# Patient Record
Sex: FEMALE | Race: BLACK OR AFRICAN AMERICAN | NOT HISPANIC OR LATINO | Employment: FULL TIME | URBAN - METROPOLITAN AREA
[De-identification: names, ages, dates, MRNs, and addresses within clinical notes are randomized per-mention and may not be internally consistent; named-entity substitution may affect disease eponyms.]

---

## 2017-05-30 ENCOUNTER — NON-PROVIDER VISIT (OUTPATIENT)
Dept: URGENT CARE | Facility: CLINIC | Age: 37
End: 2017-05-30

## 2017-05-30 DIAGNOSIS — Z02.1 PRE-EMPLOYMENT DRUG SCREENING: ICD-10-CM

## 2017-05-30 LAB
AMP AMPHETAMINE: NORMAL
COC COCAINE: NORMAL
INT CON NEG: NORMAL
INT CON POS: NORMAL
MET METHAMPHETAMINES: NORMAL
OPI OPIATES: NORMAL
PCP PHENCYCLIDINE: NORMAL
POC DRUG COMMENT 753798-OCCUPATIONAL HEALTH: NORMAL
THC: NORMAL

## 2017-05-30 PROCEDURE — 80305 DRUG TEST PRSMV DIR OPT OBS: CPT | Performed by: PHYSICIAN ASSISTANT

## 2017-07-18 ENCOUNTER — NON-PROVIDER VISIT (OUTPATIENT)
Dept: OCCUPATIONAL MEDICINE | Facility: CLINIC | Age: 37
End: 2017-07-18

## 2017-07-18 DIAGNOSIS — Z02.1 DRUG SCREENING, PRE-EMPLOYMENT: ICD-10-CM

## 2017-07-18 PROCEDURE — 8898 PR URINE 11 PANEL - SEND TO LAB: Performed by: PREVENTIVE MEDICINE

## 2017-07-18 NOTE — MR AVS SNAPSHOT
Esa Da Silva   2017 1:10 PM   Appointment   MRN: [id number]    Department:  [facility]   Dept Phone:  [phone]    Description:  Female : 1980   Provider:  [place] NON [facility]           Allergies as of 2017     No Known Allergies      Vital Signs     Smoking Status                   Never Smoker            Basic Information     Date Of Birth Sex Race Ethnicity Preferred Language    1980 Female Black or  Non- English      Health Maintenance        Date Due Completion Dates    IMM DTaP/Tdap/Td Vaccine (1 - Tdap) 10/2/1997 ---    PAP SMEAR 10/2/1999 ---    IMM INFLUENZA (1) 2017 ---            Current Immunizations     Tuberculin Skin Test 2016  2:14 PM, 2016  2:14 PM, 2015 10:00 AM      Below and/or attached are the medications your provider expects you to take. Review all of your home medications and newly ordered medications with your provider and/or pharmacist. Follow medication instructions as directed by your provider and/or pharmacist. Please keep your medication list with you and share with your provider. Update the information when medications are discontinued, doses are changed, or new medications (including over-the-counter products) are added; and carry medication information at all times in the event of emergency situations     Allergies:  No Known Allergies          Medications  Valid as of: 2017 -  4:45 PM    Generic Name Brand Name Tablet Size Instructions for use    Cephalexin (Cap) KEFLEX 500 MG Take 1 Cap by mouth 4 times a day.        HydroCHLOROthiazide (Tab) HYDRODIURIL 25 MG Take 1 Tab by mouth every day.        .                 Medicines prescribed today were sent to:     None      Medication refill instructions:       If your prescription bottle indicates you have medication refills left, it is not necessary to call your provider’s office. Please contact your pharmacy and they will refill your  medication.    If your prescription bottle indicates you do not have any refills left, you may request refills at any time through one of the following ways: The online [employer] system (except Urgent Care), by calling your provider’s office, or by asking your pharmacy to contact your provider’s office with a refill request. Medication refills are processed only during regular business hours and may not be available until the next business day. Your provider may request additional information or to have a follow-up visit with you prior to refilling your medication.   *Please Note: Medication refills are assigned a new Rx number when refilled electronically. Your pharmacy may indicate that no refills were authorized even though a new prescription for the same medication is available at the pharmacy. Please request the medicine by name with the pharmacy before contacting your provider for a refill.           [employer] Access Code: [email or web address]  Expires: 8/18/2017  4:45 PM    [employer]  A secure, online tool to manage your health information     [employer]’s [employer]® is a secure, online tool that connects you to your personalized health information from the privacy of your home -- day or night - making it very easy for you to manage your healthcare. Once the activation process is completed, you can even access your medical information using the [employer] meghna, which is available for free in the Apple Meghna store or Google Play store.     [employer] provides the following levels of access (as shown below):   My Chart Features   [facility]  Specialists [facility]  Urgent  Care [facility]  Primary Care  Doctor   Email your healthcare team securely and privately 24/7 X X X    Manage appointments: schedule your next appointment; view details of past/upcoming appointments X      Request prescription refills. X      View recent personal medical records, including lab and immunizations X X X X   View health  record, including health history, allergies, medications X X X X   Read reports about your outpatient visits, procedures, consult and ER notes X X X X   See your discharge summary, which is a recap of your hospital and/or ER visit that includes your diagnosis, lab results, and care plan. X X       How to register for [employer]:  1. Go to  [email or web address].  2. Click on the Sign Up Now box, which takes you to the New Member Sign Up page. You will need to provide the following information:  a. Enter your [employer] Access Code exactly as it appears at the top of this page. (You will not need to use this code after you’ve completed the sign-up process. If you do not sign up before the expiration date, you must request a new code.)   b. Enter your date of birth.   c. Enter your home email address.   d. Click Submit, and follow the next screen’s instructions.  3. Create a [employer] ID. This will be your [employer] login ID and cannot be changed, so think of one that is secure and easy to remember.  4. Create a [employer] password. You can change your password at any time.  5. Enter your Password Reset Question and Answer. This can be used at a later time if you forget your password.   6. Enter your e-mail address. This allows you to receive e-mail notifications when new information is available in [employer].  7. Click Sign Up. You can now view your health information.    For assistance activating your [employer] account, call [phone]

## 2017-10-03 ENCOUNTER — OFFICE VISIT (OUTPATIENT)
Dept: URGENT CARE | Facility: CLINIC | Age: 37
End: 2017-10-03

## 2017-10-03 ENCOUNTER — NON-PROVIDER VISIT (OUTPATIENT)
Dept: URGENT CARE | Facility: CLINIC | Age: 37
End: 2017-10-03

## 2017-10-03 DIAGNOSIS — Z01.89 RESPIRATORY CLEARANCE EXAMINATION, ENCOUNTER FOR: ICD-10-CM

## 2017-10-03 PROCEDURE — 94375 RESPIRATORY FLOW VOLUME LOOP: CPT | Performed by: NURSE PRACTITIONER

## 2017-10-03 NOTE — PROGRESS NOTES
Esa Da Silva is a 37 y.o. female here for a non-provider visit for Mask Fit/ Respiratory Clearance    If abnormal was an in office provider notified today (if so, indicate provider)? Yes  Routed to PCP? No

## 2022-06-08 ENCOUNTER — APPOINTMENT (OUTPATIENT)
Dept: INTERNAL MEDICINE | Facility: OTHER | Age: 42
End: 2022-06-08
Payer: COMMERCIAL

## 2022-06-29 ENCOUNTER — OFFICE VISIT (OUTPATIENT)
Dept: INTERNAL MEDICINE | Facility: OTHER | Age: 42
End: 2022-06-29
Payer: COMMERCIAL

## 2022-06-29 VITALS
WEIGHT: 250.6 LBS | BODY MASS INDEX: 42.78 KG/M2 | OXYGEN SATURATION: 93 % | HEIGHT: 64 IN | TEMPERATURE: 98.2 F | HEART RATE: 75 BPM | DIASTOLIC BLOOD PRESSURE: 103 MMHG | SYSTOLIC BLOOD PRESSURE: 183 MMHG

## 2022-06-29 DIAGNOSIS — Z00.00 ENCOUNTER FOR PREVENTIVE HEALTH EXAMINATION: ICD-10-CM

## 2022-06-29 DIAGNOSIS — I10 HYPERTENSION, UNSPECIFIED TYPE: ICD-10-CM

## 2022-06-29 DIAGNOSIS — E66.01 CLASS 3 SEVERE OBESITY DUE TO EXCESS CALORIES WITH SERIOUS COMORBIDITY AND BODY MASS INDEX (BMI) OF 40.0 TO 44.9 IN ADULT (HCC): ICD-10-CM

## 2022-06-29 DIAGNOSIS — F32.89 OTHER DEPRESSION: ICD-10-CM

## 2022-06-29 DIAGNOSIS — D64.9 ANEMIA, UNSPECIFIED TYPE: ICD-10-CM

## 2022-06-29 DIAGNOSIS — I21.3 ACUTE ST ELEVATION MYOCARDIAL INFARCTION (STEMI), UNSPECIFIED ARTERY (HCC): ICD-10-CM

## 2022-06-29 PROBLEM — R73.02 IMPAIRED GLUCOSE TOLERANCE: Status: ACTIVE | Noted: 2021-07-01

## 2022-06-29 PROBLEM — E66.09 OBESITY DUE TO EXCESS CALORIES WITH SERIOUS COMORBIDITY: Status: ACTIVE | Noted: 2022-06-29

## 2022-06-29 PROBLEM — F32.A DEPRESSION: Status: ACTIVE | Noted: 2022-06-29

## 2022-06-29 PROCEDURE — 93000 ELECTROCARDIOGRAM COMPLETE: CPT | Performed by: INTERNAL MEDICINE

## 2022-06-29 PROCEDURE — 99204 OFFICE O/P NEW MOD 45 MIN: CPT | Mod: GC | Performed by: HOSPITALIST

## 2022-06-29 PROCEDURE — 93000 ELECTROCARDIOGRAM COMPLETE: CPT | Mod: GC | Performed by: HOSPITALIST

## 2022-06-29 RX ORDER — METOPROLOL SUCCINATE 50 MG/1
50 TABLET, EXTENDED RELEASE ORAL DAILY
COMMUNITY
End: 2022-06-29

## 2022-06-29 RX ORDER — CARVEDILOL 3.12 MG/1
3.12 TABLET ORAL 2 TIMES DAILY WITH MEALS
Qty: 60 TABLET | Refills: 0 | Status: SHIPPED | OUTPATIENT
Start: 2022-06-29 | End: 2022-07-01

## 2022-06-29 RX ORDER — CLOPIDOGREL BISULFATE 75 MG/1
75 TABLET ORAL DAILY
COMMUNITY

## 2022-06-29 RX ORDER — LISINOPRIL 20 MG/1
20 TABLET ORAL 2 TIMES DAILY
COMMUNITY
End: 2022-06-29

## 2022-06-29 RX ORDER — ATORVASTATIN CALCIUM 80 MG/1
40 TABLET, FILM COATED ORAL 2 TIMES DAILY
COMMUNITY
Start: 2021-06-08

## 2022-06-29 ASSESSMENT — ENCOUNTER SYMPTOMS
SENSORY CHANGE: 0
VOMITING: 0
BLURRED VISION: 0
SHORTNESS OF BREATH: 0
DOUBLE VISION: 0
DIARRHEA: 0
HEADACHES: 0
WEAKNESS: 0
CLAUDICATION: 0
DEPRESSION: 1
NAUSEA: 0
PALPITATIONS: 0
NERVOUS/ANXIOUS: 0
CONSTIPATION: 0
CHILLS: 0
COUGH: 0
PHOTOPHOBIA: 0
TINGLING: 0
FEVER: 0
ABDOMINAL PAIN: 0
MYALGIAS: 0
HEARTBURN: 0
LOSS OF CONSCIOUSNESS: 0
WEIGHT LOSS: 0
DIZZINESS: 0
SEIZURES: 0
BLOOD IN STOOL: 0
FOCAL WEAKNESS: 0

## 2022-06-29 ASSESSMENT — PATIENT HEALTH QUESTIONNAIRE - PHQ9: CLINICAL INTERPRETATION OF PHQ2 SCORE: 0

## 2022-06-29 ASSESSMENT — LIFESTYLE VARIABLES: SUBSTANCE_ABUSE: 1

## 2022-06-29 NOTE — PROGRESS NOTES
Teaching Physician Attestation      Level of Participation    I have personally interviewed and examined the patient.  In addition, I discussed with the resident physician the patient's history, exam, assessment and plan in detail.  Topics listed in my addendum were the focus of the visit.  Healthcare maintenance was not addressed this visit unless listed as a topic in my addendum.  I agree with the plan as written along with the following additions/modifications:      Establishing Care      PMH: CAD s/p stent, aneamia, morbid obesity with prediabetes,  Marijuana and alcohol use (4 drinks) on weekends    Hypertensive Urgency  -No neurologic or cardiovascular symptoms, [place] prehospital stroke screen negative.  Although EKG has suboptimal baseline, she has no clear ischemic changes.  EKG is pertinent for borderline left axis deviation possibly secondary to left anterior fascicular block, also abnormal R wave transition in lead II, possibly secondary to lead placement.    Plan  -Begin Coreg 3.125 twice daily given dual purpose of control of heart rate given history of coronary disease and also hypertension control, check BMP.  -Follow-up in 2 days for further titration, any signs or symptoms of endorgan damage to ED immediately    History of coronary artery disease status post stent  -Encourage patient to restart her atorvastatin 80 mg daily prescribed by her cardiologist, also emphasized the critical nature of continuing clopidogrel 75 mg daily to prevent stent restenosis.  Encouraged her to follow-up with her cardiologist for further management, appreciate support, asked her to request records.  Of note, we have stopped metoprolol and started carvedilol given that she was lost to follow-up with cardiology and using Coreg for the dual purpose of treating her hypertensive urgency and also heart rate control for history of coronary artery disease.      Depression  -no alarm sx, +phq2  -Not fully addressed due to  above, but behavioral health referral placed, appreciate support    Morbid obesity with history of prediabetes  -Checking A1c, lipids, fasting CMP, not fully addressed, follow-up in future visit    Check cbc to assess anemia status    rtc 2 days for hypertension follow-up.

## 2022-06-29 NOTE — PROGRESS NOTES
"Subjective     Esa Da Silva is a 41 y.o. female who presents with New Patient (Patient here for htn and medication refills )            HPI  Patient presents today to \"get back on track with health\".  Patient reports that she has not seen a doctor in approximately 1 year and has not taking her medications in 1 year.    Past medical history: Prediabetes, hypertension, myocardial infarction status post stent in 2017, anemia    Social history: Patient reports that she is never smoked tobacco products and she has never vapped.  Patient reports having 4 alcoholic beverages on the weekend as well as using marijuana on the weekends socially.    Past surgical history: Patient reports having heart cath placed in 2017, and 3 C-sections.    Family history: Hypertension in her mother, stroke in both grandparents    Medications: Hydrochlorothiazide 25 mg daily, lisinopril 20 mg twice daily, furosemide 20 mg daily, clopidogrel 75 mg, atorvastatin 40 mg, metoprolol succinate 50 mg daily.  Of note patient reports that she has not taking any of the said medications in approximately 1 year.    Allergies: No allergies    1. Hypertension, unspecified type  In office blood pressure noted initially elevated at 204/107, repeat blood pressure improved to 193/103.  Patient denied any lightheadedness, chest pain, palpitations, vision changes, dizziness, or headache.  EKG in office revealed sinus rhythm, left axis deviation, and possible left atrial abnormality.  Physical exam was negative for facial droop, slurred speech, and pronator drift.  Patient reports at home blood pressure readings in the last month ranging from 115/1 10-2 100s over 100s.  Of note when discussed proper technique and taking blood pressure, patient reports that she is often talking during her blood pressure measurements at home.    2. Obesity  BMI today is elevated at 43.29. Patient reports a diet that mainly consists of quick and easy foods to make such as top " "Gerard.  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      3. Depression  PHQ 2 depression screening was positive for patient reporting loss of interest less than half of the days over the past 2 weeks and depressed mood for less than half of the days over the past 2 weeks.  Patient denies any suicidal or homicidal ideation at this time and denies any anxiety.  Patient reports that she has had these depressive symptoms and loss of interest for the past 2 years.  Patient is interested in seeing someone in behavioral health to discuss further.    Review of Systems   Constitutional: Negative for chills, fever, malaise/fatigue and weight loss.   HENT: Negative for ear pain, hearing loss and tinnitus.    Eyes: Negative for blurred vision, double vision and photophobia.   Respiratory: Negative for cough and shortness of breath.    Cardiovascular: Negative for chest pain, palpitations, claudication and leg swelling.   Gastrointestinal: Negative for abdominal pain, blood in stool, constipation, diarrhea, heartburn, melena, nausea and vomiting.   Genitourinary: Negative for dysuria and hematuria.   Musculoskeletal: Negative for joint pain and myalgias.   Neurological: Negative for dizziness, tingling, sensory change, focal weakness, seizures, loss of consciousness, weakness and headaches.   Psychiatric/Behavioral: Positive for depression and substance abuse. Negative for suicidal ideas. The patient is not nervous/anxious.               Objective     BP (!) 183/103 (BP Location: Right arm, Patient Position: Sitting, BP Cuff Size: Large adult long) Comment: repeat bp 5 mins  Pulse 75   Temp 36.8 °C (98.2 °F) (Temporal)   Ht 1.621 m (5' 3.8\")   Wt 114 kg (250 lb 9.6 oz)   SpO2 93%   BMI 43.29 kg/m²      Physical Exam  Constitutional:       General: She is not in acute distress.     Appearance: She is not " ill-appearing or toxic-appearing.   HENT:      Head: Normocephalic and atraumatic.      Mouth/Throat:      Pharynx: No oropharyngeal exudate or posterior oropharyngeal erythema.   Eyes:      General: No scleral icterus.        Right eye: No discharge.         Left eye: No discharge.      Extraocular Movements: Extraocular movements intact.   Cardiovascular:      Rate and Rhythm: Normal rate and regular rhythm.      Pulses: Normal pulses.      Heart sounds: Normal heart sounds.   Pulmonary:      Effort: Pulmonary effort is normal. No respiratory distress.      Breath sounds: Normal breath sounds. No wheezing.   Abdominal:      General: Bowel sounds are normal. There is no distension.      Palpations: Abdomen is soft.      Tenderness: There is no abdominal tenderness. There is no guarding.   Musculoskeletal:         General: Swelling present. No tenderness, deformity or signs of injury.      Cervical back: Normal range of motion and neck supple. No rigidity or tenderness.   Skin:     General: Skin is warm and dry.      Coloration: Skin is not jaundiced or pale.   Neurological:      General: No focal deficit present.      Mental Status: She is alert.      Motor: No weakness.      Coordination: Coordination normal.      Gait: Gait normal.      Comments: Negative for facial droop, pronator drift, slurred speech, and any focal deficits.   Psychiatric:         Mood and Affect: Mood normal.         Behavior: Behavior normal.                   Assessment & Plan         Patient was evaluated today and was found to have elevated blood pressure.  Labs and evaluation were ordered to determine the necessity of continuing patient's previous medications.  After this visit patient was only advised to take carvedilol, atorvastatin, and the clopidogrel (at the dosage of her previous provider).  Patient was also asked to reestablish with her cardiologist at [facility] for additional management.      1. Hypertension, unspecified  type  In office blood pressure elevated at 204/107 repeat blood pressure 183/103.  Patient was without any neurologic or cardiovascular symptoms; physical exam was within normal limits outside of lower extremity swelling, and [place] prehospital stroke screen was negative.  EKG in office was negative for clear ischemic changes, but did display some left axis deviation and possible left atrial enlargement with abnormal R wave transitions in lead II that could have been from a misplaced lead.  -we have stopped metoprolol and started carvedilol given that she was lost to follow-up with cardiology.  Patient is advised to take Coreg 3.125 mg twice daily for blood pressure control  -Reestablish with cardiology in [facility]  -Patient advised to monitor blood pressure and to call our office or stop the Coreg should she see more than a 25% decrease of her systolic blood pressure within 1 hour. Goal blood pressure reduction for hypertensive crisis is reduce BP by a max of 25% over 1st hour, then to 160/100-110 over the next 2-6 hours, then to normal over the next 24-48 hours  -Patient to follow-up on Friday after lab work is obtained and to measure blood pressure and titrate medications appropriately  -Patient educated on the effects of high blood pressure on organs and resulting endorgan damage.  Patient also education educated on the role of diet and weight on blood pressure.  Home blood pressure monitoring:  - check your blood pressure every day and put it in a log  - pick a different time each day so we can see how it varies throughout the day  - when you check your blood pressure: make sure you sit quietly for 5-10 minutes beforehand, keep both feet flat on the ground, and make sure you use an arm cuff at heart height    Lifestyle factors to help manage blood pressure:  1) reduce stress with daily activity, consider yoga, breathing exercises (like 4-7-8 breathing technique)   2) reduce sodium to <2000 mg/day  3)  DASH diet     4) 200-300 min/week cardio, which you can build up to.       2. Obesity  Today BMI elevated at 43.29.  Patient educated on the effects of weight on blood pressure, organs, and overall health.  Patient meets criteria for having weight management issue based on their BMI; appropriate education counseling were provided please see below for some details:  - Encouraged diet high in fruits, vegetables, and fiber. And a diet low in salt, refined carbohydrates, cholesterol, saturated fat, and trans fatty acids.    -I recommend decreasing your intake of saturated fats which are found in meats that come from a cow or pig. Saturated fats are also found in creams, cheeses, butter, mayonnaise, and fried foods.  - Encouraged  a minimum of 30 minutes of moderate intensity aerobic exercise (eg, brisk walking) is recommended on five days each week. Or 20 minutes of vigorous-intensity aerobic exercise (eg, jogging) on three days each week.   -Referral to nutrition  -Continue atorvastatin  -CMP  -Lipid profile  -Hemoglobin A1c    3. Depression  PHQ 2 positive - patient reports decreased interest and depression less than half of the days over the last 2 weeks. Patient denies any suicidal homicidal ideation.  -Referral to behavioral health  -Vitamin D screening    Follow up: Please complete lab work and follow-up with our office in 2 days to check your blood pressure, review labwork, and to titrate your medications. Please call our offices earlier as needed.    Should you experience any shortness of breath, chest pain, facial weakness, tingling or weakness anywhere please proceed to the ED for further evaluation.

## 2022-06-29 NOTE — PATIENT INSTRUCTIONS
Thank you for allowing us participate in your care today.  Lab work has been sent in on your behalf as well as the referrals we discussed.  Please complete lab work and follow-up with our office in 2 days to check your blood pressure, review labwork, and to titrate your medications. Please call our offices earlier as needed.    Please monitor your blood pressure while on the coreg, as discussed we want to decrease your blood pressure slowly, the systolic blood pressure (top number) should not be lower than 140 within the first hour of starting the coreg. The meds that are ok to take are coreg, clopidogrel, and atorvastatin as discussed.     Should you experience any shortness of breath, chest pain, facial weakness, tingling or weakness anywhere please proceed to the ED for further evaluation.

## 2022-07-01 ENCOUNTER — OFFICE VISIT (OUTPATIENT)
Dept: INTERNAL MEDICINE | Facility: OTHER | Age: 42
End: 2022-07-01
Payer: COMMERCIAL

## 2022-07-01 VITALS
OXYGEN SATURATION: 96 % | BODY MASS INDEX: 41.55 KG/M2 | HEART RATE: 99 BPM | SYSTOLIC BLOOD PRESSURE: 173 MMHG | TEMPERATURE: 97.8 F | WEIGHT: 249.4 LBS | HEIGHT: 65 IN | DIASTOLIC BLOOD PRESSURE: 116 MMHG

## 2022-07-01 DIAGNOSIS — I16.0 HYPERTENSIVE URGENCY: ICD-10-CM

## 2022-07-01 DIAGNOSIS — E66.01 CLASS 3 SEVERE OBESITY DUE TO EXCESS CALORIES WITH SERIOUS COMORBIDITY AND BODY MASS INDEX (BMI) OF 40.0 TO 44.9 IN ADULT (HCC): ICD-10-CM

## 2022-07-01 DIAGNOSIS — I25.10 CORONARY ARTERY DISEASE INVOLVING NATIVE CORONARY ARTERY OF NATIVE HEART WITHOUT ANGINA PECTORIS: ICD-10-CM

## 2022-07-01 DIAGNOSIS — D64.9 ANEMIA, UNSPECIFIED TYPE: ICD-10-CM

## 2022-07-01 DIAGNOSIS — R73.02 IMPAIRED GLUCOSE TOLERANCE: ICD-10-CM

## 2022-07-01 DIAGNOSIS — I10 PRIMARY HYPERTENSION: ICD-10-CM

## 2022-07-01 DIAGNOSIS — I10 HYPERTENSION, UNSPECIFIED TYPE: ICD-10-CM

## 2022-07-01 PROCEDURE — 99214 OFFICE O/P EST MOD 30 MIN: CPT | Mod: GC | Performed by: STUDENT IN AN ORGANIZED HEALTH CARE EDUCATION/TRAINING PROGRAM

## 2022-07-01 RX ORDER — LOSARTAN POTASSIUM AND HYDROCHLOROTHIAZIDE 12.5; 5 MG/1; MG/1
1 TABLET ORAL DAILY
Qty: 30 TABLET | Refills: 0 | Status: SHIPPED | OUTPATIENT
Start: 2022-07-01 | End: 2022-07-05 | Stop reason: SDUPTHER

## 2022-07-01 RX ORDER — CARVEDILOL 3.12 MG/1
6.25 TABLET ORAL 2 TIMES DAILY WITH MEALS
Qty: 60 TABLET | Refills: 0
Start: 2022-07-01 | End: 2022-07-05

## 2022-07-01 RX ORDER — FUROSEMIDE 20 MG/1
TABLET ORAL
COMMUNITY
End: 2022-07-01

## 2022-07-01 RX ORDER — ATORVASTATIN CALCIUM 40 MG/1
TABLET, FILM COATED ORAL
COMMUNITY
End: 2022-07-01

## 2022-07-01 RX ORDER — LANOLIN ALCOHOL/MO/W.PET/CERES
CREAM (GRAM) TOPICAL
COMMUNITY
Start: 2021-07-22 | End: 2022-07-01

## 2022-07-01 NOTE — PATIENT INSTRUCTIONS
Hello! Today we saw you for follow up of blood pressure.    Take 2 of your carvedilol so that you will take carvedilol 6.25 twice a day. Start losartan-hydrochlorothiazide as well.    Check your blood pressure daily. Check first thing in the morning, after you have sat down for 5-10 minutes. Make sure your arm is resting with legs uncrossed, flat on the floor. Record your blood pressure and pulse numbers. Bring the recordings to your next appointnment and bring your blood pressure cuff to make sure it's calibrated properly.    Get your labs done today.    Make an appointment with Dr. Metz in 1 week.    Thank you!

## 2022-07-01 NOTE — ASSESSMENT & PLAN NOTE
Severe today in clinic. During her last visit with Dr. Metz it was 204 SBP. Today in clinic it was 189/128; repeat was 173/116.    I have counseled the patient extensively on the issue of this uncontrolled hypertension, including severe complications such as stroke, repeat myocardial infarction, blindness, and kidney failure.  Also discussed the Dash diet.    - Increase carvedilol dosage to 6.25 mg twice daily  - Started losartan-hydrochlorothiazide 50- 12.5 daily  - Advised patient to take blood pressure measurements every day and record them in a blood pressure log.  Told the patient to bring her blood pressure log during her next visit as well as bring her blood pressure cuff for calibration.  - Given handouts on the DASH diet and hypertension  -Instructed patient on how to take blood pressure properly: Check first thing in the morning, after she has sat down for 5-10 minutes. Make sure arm is resting with legs uncrossed, flat on the floor.   -Patient to follow-up in 1 week for blood pressure  - Patient will obtain her lab work today.

## 2022-07-01 NOTE — ASSESSMENT & PLAN NOTE
Counseled the patient extensively on the importance of diet and exercise.  This is class III obesity.

## 2022-07-01 NOTE — PROGRESS NOTES
Established Patient    Esa Da Silva is a 41 y.o. female who presents today with the following:    CC:   Chief Complaint   Patient presents with   • Follow-Up     2 day follow up   • Hypertension     Pt was put on new BP med 2 days ago, started taking yesterday        HPI:     Patient is a pleasant 40 y/o F with a PMH of myocardial infarction (2017 s/p 1 stent), HTN, class 3 obesity, HTN, anemia, and depression who presents for follow up of hypertension.    HTN, myocardial infarction: Had a MI in 2017, s/p 1 stent. She was treated at that time at [facility]. Did not go to cardiac rehab afterward. She was put on carvedilol 3.125 mg BID 2 days ago. She started taking it yesterday and took it before coming to her appointment today. Today her blood pressure was 189/128; repeat was 173/116; heart rate was 99. She will do her labs today (ordered by Dr. Metz during her last visit). She did not check her blood pressures at home due to being too busy. She does not have any changes in vision, dizziness, headache. She made an appointment with [facility] cardiology - August 1. She will get her records from there to [facility] when she goes to her appointment. She has not been exercising; she is very active at work ( at [employer]) where she walks constantly.    Class 3 obesity: Patient walks regularly at work as a . Not following a DASH/healthy diet.      ROS See HPI    Patient Active Problem List    Diagnosis Date Noted   • Hypertensive urgency 07/01/2022   • Depression 06/29/2022   • Obesity due to excess calories with serious comorbidity 06/29/2022   • Impaired glucose tolerance 07/01/2021   • Acute ST elevation myocardial infarction (STEMI) ([facility]) 10/22/2017   • Anemia 02/23/2015   • Primary hypertension 09/05/2014       Social History     Tobacco Use   • Smoking status: Never Smoker   • Smokeless tobacco: Never Used   Vaping Use   • Vaping Use: Never used   Substance Use Topics   •  "Alcohol use: Yes     Comment: socially   • Drug use: Yes     Types: Marijuana       Current Outpatient Medications   Medication Sig Dispense Refill   • carvedilol (COREG) 3.125 MG Tab Take 2 Tablets by mouth 2 times a day with meals. 60 Tablet 0   • losartan-hydrochlorothiazide (HYZAAR) 50-12.5 MG per tablet Take 1 Tablet by mouth every day. 30 Tablet 0   • atorvastatin (LIPITOR) 80 MG tablet 40 Tablets by Per G Tube route 2 times a day.     • clopidogrel (PLAVIX) 75 MG Tab Take 75 mg by mouth every day.       No current facility-administered medications for this visit.       BP (!) 173/116 (BP Location: Left arm, Patient Position: Sitting, BP Cuff Size: Adult)   Pulse 99   Temp 36.6 °C (97.8 °F) (Temporal)   Ht 1.651 m (5' 5\")   Wt 113 kg (249 lb 6.4 oz)   SpO2 96%   BMI 41.50 kg/m²     PHYSICAL EXAM:  Physical Exam  Vitals and nursing note reviewed.   Constitutional:       General: She is not in acute distress.     Appearance: Normal appearance. She is obese. She is not ill-appearing.   HENT:      Head: Normocephalic and atraumatic.      Right Ear: External ear normal.      Left Ear: External ear normal.      Nose: Nose normal. No congestion or rhinorrhea.      Mouth/Throat:      Mouth: Mucous membranes are moist.      Pharynx: Oropharynx is clear. No oropharyngeal exudate.   Eyes:      General:         Right eye: No discharge.         Left eye: No discharge.      Extraocular Movements: Extraocular movements intact.      Conjunctiva/sclera: Conjunctivae normal.      Pupils: Pupils are equal, round, and reactive to light.   Cardiovascular:      Rate and Rhythm: Regular rhythm. Tachycardia present.      Pulses: Normal pulses.   Pulmonary:      Effort: Pulmonary effort is normal. No respiratory distress.      Breath sounds: Normal breath sounds. No wheezing or rales.   Abdominal:      General: Bowel sounds are normal. There is no distension.      Palpations: Abdomen is soft.      Tenderness: There is no " abdominal tenderness. There is no right CVA tenderness or left CVA tenderness.      Comments: protuberant   Musculoskeletal:         General: No swelling or tenderness. Normal range of motion.      Cervical back: Normal range of motion and neck supple. No rigidity or tenderness.      Right lower leg: No edema.      Left lower leg: No edema.   Skin:     General: Skin is warm and dry.      Capillary Refill: Capillary refill takes less than 2 seconds.      Findings: No bruising.   Neurological:      Mental Status: She is alert and oriented to person, place, and time. Mental status is at baseline.      Cranial Nerves: No cranial nerve deficit.      Sensory: No sensory deficit.      Coordination: Coordination normal.   Psychiatric:         Mood and Affect: Mood normal.         Behavior: Behavior normal.         Thought Content: Thought content normal.         Judgment: Judgment normal.           Assessment and Plan    Hypertensive urgency  Severe today in clinic. During her last visit with Dr. Metz it was 204 SBP. Today in clinic it was 189/128; repeat was 173/116.    I have counseled the patient extensively on the issue of this uncontrolled hypertension, including severe complications such as stroke, repeat myocardial infarction, blindness, and kidney failure.  Also discussed the Dash diet.    - Increase carvedilol dosage to 6.25 mg twice daily  - Started losartan-hydrochlorothiazide 50- 12.5 daily  - Advised patient to take blood pressure measurements every day and record them in a blood pressure log.  Told the patient to bring her blood pressure log during her next visit as well as bring her blood pressure cuff for calibration.  - Given handouts on the DASH diet and hypertension  -Instructed patient on how to take blood pressure properly: Check first thing in the morning, after she has sat down for 5-10 minutes. Make sure arm is resting with legs uncrossed, flat on the floor.   -Patient to follow-up in 1 week for  blood pressure  - Patient will obtain her lab work today.    Impaired glucose tolerance  Patient will get her labs done today    Anemia  Patient will get her labs done today    Coronary artery disease involving native coronary artery of native heart without angina pectoris, status-post stent  Had STEMI in 2017 s/p 1 stent. Patient has appt with Cardiology at [facility] on August 1. Defer to cardiologist for treatment. Will continue carvedilol, plavix, atorvastatin for tertiary prevention. Adding losartan-HCTZ; this will be renal protective.    Primary hypertension  Uncontrolled at this time. See problem of hypertensive urgency.    Obesity due to excess calories with serious comorbidity  Counseled the patient extensively on the importance of diet and exercise.  This is class III obesity.    Follow-up in 1 week with Dr. Metz.    Signed by: Rosy Patton M.D.

## 2022-07-01 NOTE — ASSESSMENT & PLAN NOTE
Had STEMI in 2017 s/p 1 stent. Patient has appt with Cardiology at [facility] on August 1. Defer to cardiologist for treatment. Will continue carvedilol, plavix, atorvastatin for tertiary prevention. Adding losartan-HCTZ; this will be renal protective.

## 2022-07-05 ENCOUNTER — TELEPHONE (OUTPATIENT)
Dept: INTERNAL MEDICINE | Facility: OTHER | Age: 42
End: 2022-07-05
Payer: COMMERCIAL

## 2022-07-05 DIAGNOSIS — I10 HYPERTENSION, UNSPECIFIED TYPE: ICD-10-CM

## 2022-07-05 DIAGNOSIS — I21.3 ACUTE ST ELEVATION MYOCARDIAL INFARCTION (STEMI), UNSPECIFIED ARTERY (HCC): ICD-10-CM

## 2022-07-05 RX ORDER — LOSARTAN POTASSIUM AND HYDROCHLOROTHIAZIDE 12.5; 5 MG/1; MG/1
1 TABLET ORAL DAILY
Qty: 30 TABLET | Refills: 0 | Status: SHIPPED | OUTPATIENT
Start: 2022-07-05 | End: 2022-07-07

## 2022-07-05 RX ORDER — CARVEDILOL 6.25 MG/1
6.25 TABLET ORAL 2 TIMES DAILY WITH MEALS
Qty: 60 TABLET | Refills: 0 | Status: SHIPPED | OUTPATIENT
Start: 2022-07-05 | End: 2022-08-02

## 2022-07-05 NOTE — TELEPHONE ENCOUNTER
Called [facility] at the number listed ([phone]): There was some confusion regarding medication refills. I am resending the meds to [facility]. Patient will see Dr. Mares on 7/7 for further titration of BP medications.    Rosy Patton MD, MPH  [facility], PGY-3

## 2022-07-05 NOTE — TELEPHONE ENCOUNTER
Spoke to patient unable to  refill for Carvedilol 6.25 bid. [facility] pharmacy [phone] requesting a call from Dr Patton to clarify medication. Medication was received but was cancel by Dr Patton per pharmacy. I will route to Dr Patton for review.

## 2022-07-07 ENCOUNTER — OFFICE VISIT (OUTPATIENT)
Dept: INTERNAL MEDICINE | Facility: OTHER | Age: 42
End: 2022-07-07
Payer: COMMERCIAL

## 2022-07-07 VITALS
WEIGHT: 245.6 LBS | HEIGHT: 65 IN | BODY MASS INDEX: 40.92 KG/M2 | SYSTOLIC BLOOD PRESSURE: 162 MMHG | HEART RATE: 71 BPM | DIASTOLIC BLOOD PRESSURE: 106 MMHG | TEMPERATURE: 98.9 F | OXYGEN SATURATION: 100 %

## 2022-07-07 DIAGNOSIS — I10 PRIMARY HYPERTENSION: ICD-10-CM

## 2022-07-07 DIAGNOSIS — D64.9 ANEMIA, UNSPECIFIED TYPE: ICD-10-CM

## 2022-07-07 PROCEDURE — 99213 OFFICE O/P EST LOW 20 MIN: CPT | Mod: GE

## 2022-07-07 RX ORDER — LOSARTAN POTASSIUM 100 MG/1
100 TABLET ORAL DAILY
Qty: 30 TABLET | Refills: 2 | Status: SHIPPED | OUTPATIENT
Start: 2022-07-07 | End: 2022-07-29 | Stop reason: SDUPTHER

## 2022-07-07 RX ORDER — CHLORTHALIDONE 25 MG/1
25 TABLET ORAL DAILY
Qty: 30 TABLET | Refills: 2 | Status: SHIPPED | OUTPATIENT
Start: 2022-07-07 | End: 2022-07-29 | Stop reason: SDUPTHER

## 2022-07-07 ASSESSMENT — FIBROSIS 4 INDEX: FIB4 SCORE: 0.68

## 2022-07-07 NOTE — PATIENT INSTRUCTIONS
-You came for a follow-up visit to the clinic.  -I ordered some lab work to assess your hypertension further.  I also would like to order an ultrasound of her kidneys to assess for any secondary causes for hypertension.  -Please make sure you get your lab work done at 8 AM in the morning as it is sensitive to the timing of the day.  -Return to clinic in 2 weeks

## 2022-07-11 PROBLEM — I25.10 CORONARY ARTERY DISEASE INVOLVING NATIVE CORONARY ARTERY OF NATIVE HEART WITHOUT ANGINA PECTORIS: Status: ACTIVE | Noted: 2017-10-22

## 2022-07-11 ASSESSMENT — ENCOUNTER SYMPTOMS
NERVOUS/ANXIOUS: 0
NAUSEA: 0
WEAKNESS: 0
DEPRESSION: 0
DIARRHEA: 0
WEIGHT LOSS: 0
EYE DISCHARGE: 0
WHEEZING: 0
INSOMNIA: 0
HEADACHES: 0
CHILLS: 0
BRUISES/BLEEDS EASILY: 0
DOUBLE VISION: 0
BLOOD IN STOOL: 0
HEARTBURN: 0
SORE THROAT: 0
DIZZINESS: 0
COUGH: 0
BLURRED VISION: 0
ORTHOPNEA: 0
NECK PAIN: 0
CONSTIPATION: 0
ABDOMINAL PAIN: 0
SENSORY CHANGE: 0
BACK PAIN: 0
PALPITATIONS: 0
FEVER: 0
SPUTUM PRODUCTION: 0
CLAUDICATION: 0
VOMITING: 0
SHORTNESS OF BREATH: 0

## 2022-07-11 NOTE — PROGRESS NOTES
Subjective:     CC: Follow-up on hypertension    HPI:   Esa presents today to follow-up on her hypertension.  Patient continues to have really high blood pressures in the range of 160-170s SBP and 100-110 DBP.  Given the patient has been on carvedilol 6.25 mg and Hyzaar 50-12.5, and patient continues to be hypertensive, there might be significant concern for secondary hypertension.  Patient denies any chest pain, palpitations, headaches, abdominal pain, nausea/vomiting, constipation/diarrhea, or any other signs of endorgan damage. Patient was counseled on the sequela of hypertension. She already has had an MI and a stent placement in 2017, at the age of 36.  Patient was counseled on healthy eating habits, diet, exercise, decreased salt intake.  Given that her blood pressure is not well controlled, we would like to discontinue her combination pill, Hyzaar, and start her on losartan 100 and chlorthalidone 25 mg.  We would also like to work her up for secondary hypertension with imaging studies of the kidneys, blood work to assess for renal aldosterone pathway, and cortisol.    Patient has no other acute concerns or complaints at this time.    No problems updated.    Health Maintenance: Completed    ROS:  Review of Systems   Constitutional: Negative for chills, fever and weight loss.   HENT: Negative for congestion, hearing loss and sore throat.    Eyes: Negative for blurred vision, double vision and discharge.   Respiratory: Negative for cough, sputum production, shortness of breath and wheezing.    Cardiovascular: Negative for chest pain, palpitations, orthopnea, claudication and leg swelling.   Gastrointestinal: Negative for abdominal pain, blood in stool, constipation, diarrhea, heartburn, melena, nausea and vomiting.   Genitourinary: Negative for dysuria, frequency, hematuria and urgency.   Musculoskeletal: Negative for back pain, joint pain and neck pain.   Skin: Negative for rash.   Neurological: Negative  "for dizziness, sensory change, weakness and headaches.   Endo/Heme/Allergies: Negative for environmental allergies. Does not bruise/bleed easily.   Psychiatric/Behavioral: Negative for depression. The patient is not nervous/anxious and does not have insomnia.        Objective:     Exam:  BP (!) 162/106 (BP Location: Right arm, Patient Position: Sitting, BP Cuff Size: Large adult)   Pulse 71   Temp 37.2 °C (98.9 °F) (Temporal)   Ht 1.651 m (5' 5\")   Wt 111 kg (245 lb 9.6 oz)   SpO2 100%   BMI 40.87 kg/m²  Body mass index is 40.87 kg/m².    Physical Exam  Constitutional:       General: She is not in acute distress.     Appearance: Normal appearance. She is normal weight.   HENT:      Head: Normocephalic and atraumatic.      Right Ear: External ear normal.      Left Ear: External ear normal.      Nose: Nose normal. No congestion or rhinorrhea.      Mouth/Throat:      Mouth: Mucous membranes are moist.      Pharynx: Oropharynx is clear. No oropharyngeal exudate or posterior oropharyngeal erythema.   Eyes:      General: No scleral icterus.        Right eye: No discharge.         Left eye: No discharge.      Extraocular Movements: Extraocular movements intact.      Conjunctiva/sclera: Conjunctivae normal.      Pupils: Pupils are equal, round, and reactive to light.   Neck:      Vascular: No carotid bruit.   Cardiovascular:      Rate and Rhythm: Normal rate and regular rhythm.      Pulses: Normal pulses.      Heart sounds: Normal heart sounds. No murmur heard.    No friction rub.   Pulmonary:      Effort: Pulmonary effort is normal. No respiratory distress.      Breath sounds: Normal breath sounds. No wheezing or rhonchi.   Abdominal:      General: Abdomen is flat. Bowel sounds are normal.      Tenderness: There is no abdominal tenderness. There is no right CVA tenderness or left CVA tenderness.   Musculoskeletal:         General: No tenderness. Normal range of motion.      Cervical back: Normal range of motion and " neck supple. No rigidity.      Right lower leg: No edema.      Left lower leg: No edema.   Skin:     General: Skin is warm.      Capillary Refill: Capillary refill takes less than 2 seconds.      Findings: No bruising, lesion or rash.   Neurological:      General: No focal deficit present.      Mental Status: She is alert and oriented to person, place, and time. Mental status is at baseline.      Cranial Nerves: No cranial nerve deficit.      Coordination: Coordination normal.      Gait: Gait normal.   Psychiatric:         Mood and Affect: Mood normal.         Behavior: Behavior normal.         Thought Content: Thought content normal.         Judgment: Judgment normal.         A chaperone was offered to the patient during today's exam. Patient declined chaperone.    Labs:   No new labs to be reviewed at this time.    Assessment & Plan:     41 y.o. female with the following -     Problem List Items Addressed This Visit     Primary hypertension    Relevant Medications    losartan (COZAAR) 100 MG Tab    chlorthalidone (HYGROTON) 25 MG Tab    Other Relevant Orders    RENIN, PLASMA    RENIN ACTIVITY AND ALDOSTERONE    RENIN ACTIVITY    CORTISOL - AM    US-RENAL ARTERY DUPLEX COMP    Anemia    Relevant Orders    IRON/TOTAL IRON BIND    FERRITIN    TRANSFERRIN    RETICULOCYTES COUNT    TRANSFERRIN SATURATION          I spent a total of 35 minutes with record review, exam, communication with the patient, communication with other providers, and documentation of this encounter.    Return in about 2 weeks (around 7/21/2022).    Please note that this dictation was created using voice recognition software. I have made every reasonable attempt to correct obvious errors, but I expect that there are errors of grammar and possibly content that I did not discover before finalizing the note.

## 2022-07-13 ENCOUNTER — HOSPITAL ENCOUNTER (OUTPATIENT)
Dept: RADIOLOGY | Facility: MEDICAL CENTER | Age: 42
End: 2022-07-13

## 2022-07-13 ENCOUNTER — HOSPITAL ENCOUNTER (OUTPATIENT)
Dept: RADIOLOGY | Facility: MEDICAL CENTER | Age: 42
End: 2022-07-13
Attending: HOSPITALIST
Payer: COMMERCIAL

## 2022-07-13 DIAGNOSIS — Z12.31 VISIT FOR SCREENING MAMMOGRAM: ICD-10-CM

## 2022-07-13 PROCEDURE — 77063 BREAST TOMOSYNTHESIS BI: CPT

## 2022-07-21 ENCOUNTER — HOSPITAL ENCOUNTER (OUTPATIENT)
Dept: LAB | Facility: MEDICAL CENTER | Age: 42
End: 2022-07-21
Payer: COMMERCIAL

## 2022-07-21 DIAGNOSIS — I10 PRIMARY HYPERTENSION: ICD-10-CM

## 2022-07-21 DIAGNOSIS — D64.9 ANEMIA, UNSPECIFIED TYPE: ICD-10-CM

## 2022-07-21 LAB
FERRITIN SERPL-MCNC: 7.2 NG/ML (ref 10–291)
HGB RETIC QN AUTO: 25.2 PG/CELL (ref 29–35)
IMM RETICS NFR: 28.6 % (ref 9.3–17.4)
IRON SATN MFR SERPL: 10 % (ref 15–55)
IRON SERPL-MCNC: 37 UG/DL (ref 40–170)
RETICS # AUTO: 0.09 M/UL (ref 0.04–0.06)
RETICS/RBC NFR: 2.1 % (ref 0.8–2.1)
TIBC SERPL-MCNC: 387 UG/DL (ref 250–450)
TRANSFERRIN SERPL-MCNC: 331 MG/DL (ref 200–370)
UIBC SERPL-MCNC: 350 UG/DL (ref 110–370)

## 2022-07-21 PROCEDURE — 85046 RETICYTE/HGB CONCENTRATE: CPT

## 2022-07-21 PROCEDURE — 82728 ASSAY OF FERRITIN: CPT

## 2022-07-21 PROCEDURE — 84466 ASSAY OF TRANSFERRIN: CPT

## 2022-07-21 PROCEDURE — 83540 ASSAY OF IRON: CPT

## 2022-07-21 PROCEDURE — 84244 ASSAY OF RENIN: CPT

## 2022-07-21 PROCEDURE — 36415 COLL VENOUS BLD VENIPUNCTURE: CPT

## 2022-07-21 PROCEDURE — 83550 IRON BINDING TEST: CPT

## 2022-07-25 LAB — RENIN PLAS-CCNC: 0.5 NG/ML/HR

## 2022-07-27 DIAGNOSIS — I10 HYPERTENSION, UNSPECIFIED TYPE: ICD-10-CM

## 2022-07-27 NOTE — TELEPHONE ENCOUNTER
Losartan- Hydrochlorothiazide Refill    Last seen: 7/7/22 by Dr. Mares  Next appt: 7/28/22 with Dr. Lassiter    Was the patient seen in the last year in this department? Yes   Does patient have an active prescription for medications requested? No   Received Request Via: Pharmacy

## 2022-07-28 PROBLEM — I21.3 ACUTE ST ELEVATION MYOCARDIAL INFARCTION (STEMI) (HCC): Status: ACTIVE | Noted: 2017-10-22

## 2022-07-29 RX ORDER — LOSARTAN POTASSIUM AND HYDROCHLOROTHIAZIDE 12.5; 5 MG/1; MG/1
TABLET ORAL
Qty: 30 TABLET | Refills: 0 | OUTPATIENT
Start: 2022-07-29

## 2022-07-29 RX ORDER — LOSARTAN POTASSIUM 100 MG/1
100 TABLET ORAL DAILY
Qty: 30 TABLET | Refills: 2 | Status: SHIPPED | OUTPATIENT
Start: 2022-07-29

## 2022-07-29 RX ORDER — CHLORTHALIDONE 25 MG/1
25 TABLET ORAL DAILY
Qty: 30 TABLET | Refills: 2 | Status: SHIPPED | OUTPATIENT
Start: 2022-07-29 | End: 2022-11-07

## 2022-08-01 DIAGNOSIS — I10 HYPERTENSION, UNSPECIFIED TYPE: ICD-10-CM

## 2022-08-01 DIAGNOSIS — I21.3 ACUTE ST ELEVATION MYOCARDIAL INFARCTION (STEMI), UNSPECIFIED ARTERY (HCC): ICD-10-CM

## 2022-08-01 NOTE — TELEPHONE ENCOUNTER
Carvedilol Refill    Last seen: 7/7/22 by Dr. Mares  Next appt: 8/3/22 with Dr. Peterson    Was the patient seen in the last year in this department? Yes   Does patient have an active prescription for medications requested? No   Received Request Via: Pharmacy

## 2022-08-02 RX ORDER — CARVEDILOL 6.25 MG/1
6.25 TABLET ORAL 2 TIMES DAILY WITH MEALS
Qty: 60 TABLET | Refills: 0 | Status: SHIPPED | OUTPATIENT
Start: 2022-08-02 | End: 2022-08-03

## 2022-08-02 NOTE — TELEPHONE ENCOUNTER
Carvedilol refilled, patient to discuss blood pressure readings while on the medication during office on 8/3/22 with Dr. Peterson.  Thanks

## 2022-08-03 ENCOUNTER — OFFICE VISIT (OUTPATIENT)
Dept: INTERNAL MEDICINE | Facility: OTHER | Age: 42
End: 2022-08-03
Payer: COMMERCIAL

## 2022-08-03 VITALS
HEIGHT: 65 IN | SYSTOLIC BLOOD PRESSURE: 160 MMHG | BODY MASS INDEX: 41.82 KG/M2 | TEMPERATURE: 98.5 F | DIASTOLIC BLOOD PRESSURE: 98 MMHG | WEIGHT: 251 LBS | HEART RATE: 83 BPM | OXYGEN SATURATION: 96 %

## 2022-08-03 DIAGNOSIS — I10 HYPERTENSION, UNSPECIFIED TYPE: ICD-10-CM

## 2022-08-03 DIAGNOSIS — I25.10 CORONARY ARTERY DISEASE INVOLVING NATIVE CORONARY ARTERY OF NATIVE HEART WITHOUT ANGINA PECTORIS: ICD-10-CM

## 2022-08-03 DIAGNOSIS — I1A.0 RESISTANT HYPERTENSION: Primary | ICD-10-CM

## 2022-08-03 DIAGNOSIS — E87.6 HYPOKALEMIA: ICD-10-CM

## 2022-08-03 DIAGNOSIS — Z91.89 AT RISK FOR SLEEP APNEA: ICD-10-CM

## 2022-08-03 DIAGNOSIS — D50.9 IRON DEFICIENCY ANEMIA, UNSPECIFIED IRON DEFICIENCY ANEMIA TYPE: ICD-10-CM

## 2022-08-03 DIAGNOSIS — I21.3 ACUTE ST ELEVATION MYOCARDIAL INFARCTION (STEMI), UNSPECIFIED ARTERY (HCC): ICD-10-CM

## 2022-08-03 DIAGNOSIS — E66.01 CLASS 3 SEVERE OBESITY DUE TO EXCESS CALORIES WITH SERIOUS COMORBIDITY AND BODY MASS INDEX (BMI) OF 40.0 TO 44.9 IN ADULT (HCC): ICD-10-CM

## 2022-08-03 PROBLEM — F33.42 RECURRENT MAJOR DEPRESSIVE DISORDER, IN FULL REMISSION (HCC): Status: ACTIVE | Noted: 2022-06-29

## 2022-08-03 PROCEDURE — 99213 OFFICE O/P EST LOW 20 MIN: CPT | Mod: GE | Performed by: STUDENT IN AN ORGANIZED HEALTH CARE EDUCATION/TRAINING PROGRAM

## 2022-08-03 RX ORDER — FERROUS SULFATE 325(65) MG
325 TABLET ORAL
Qty: 30 TABLET | Refills: 2 | Status: SHIPPED | OUTPATIENT
Start: 2022-08-03

## 2022-08-03 RX ORDER — CARVEDILOL 12.5 MG/1
12.5 TABLET ORAL 2 TIMES DAILY WITH MEALS
Qty: 60 TABLET | Refills: 1 | Status: SHIPPED | OUTPATIENT
Start: 2022-08-03

## 2022-08-03 RX ORDER — SPIRONOLACTONE 25 MG/1
25 TABLET ORAL DAILY
Qty: 30 TABLET | Refills: 3 | Status: SHIPPED | OUTPATIENT
Start: 2022-08-03

## 2022-08-03 RX ORDER — POTASSIUM CHLORIDE 750 MG/1
10 TABLET, FILM COATED, EXTENDED RELEASE ORAL DAILY
Qty: 30 TABLET | Refills: 1 | Status: SHIPPED | OUTPATIENT
Start: 2022-08-03

## 2022-08-03 ASSESSMENT — PATIENT HEALTH QUESTIONNAIRE - PHQ9
8. MOVING OR SPEAKING SO SLOWLY THAT OTHER PEOPLE COULD HAVE NOTICED. OR THE OPPOSITE, BEING SO FIGETY OR RESTLESS THAT YOU HAVE BEEN MOVING AROUND A LOT MORE THAN USUAL: NOT AT ALL
9. THOUGHTS THAT YOU WOULD BE BETTER OFF DEAD, OR OF HURTING YOURSELF: NOT AT ALL
SUM OF ALL RESPONSES TO PHQ QUESTIONS 1-9: 3
5. POOR APPETITE OR OVEREATING: SEVERAL DAYS
2. FEELING DOWN, DEPRESSED, IRRITABLE, OR HOPELESS: NOT AT ALL
3. TROUBLE FALLING OR STAYING ASLEEP OR SLEEPING TOO MUCH: NOT AT ALL
4. FEELING TIRED OR HAVING LITTLE ENERGY: SEVERAL DAYS
6. FEELING BAD ABOUT YOURSELF - OR THAT YOU ARE A FAILURE OR HAVE LET YOURSELF OR YOUR FAMILY DOWN: NOT AL ALL
1. LITTLE INTEREST OR PLEASURE IN DOING THINGS: SEVERAL DAYS
SUM OF ALL RESPONSES TO PHQ9 QUESTIONS 1 AND 2: 1
7. TROUBLE CONCENTRATING ON THINGS, SUCH AS READING THE NEWSPAPER OR WATCHING TELEVISION: NOT AT ALL

## 2022-08-03 ASSESSMENT — FIBROSIS 4 INDEX: FIB4 SCORE: 0.68

## 2022-08-03 NOTE — PROGRESS NOTES
Subjective     Esa Da Silva is a 41 y.o. female who presents with Follow-Up (Hypertension )  Patient has past medical history of uncontrolled hypertension, hyperlipidemia, MI status post 1 stent in 2017, obesity with BMI of 41.7.    Hypertension  Patient is on 3 antihypertensive medications with uncontrolled blood pressure.  Today blood pressure of 160/98.  Patient reports that home blood pressure also averages around 160s.  She is trying to reduce salt in her diet but is not able to incorporate exercise.  She works 3 days a week [employer] shifts which is little stressful.  On further questioning patient reports that she had preeclampsia/hypertension diagnosed during pregnancy in 2009 after which her blood pressure remained high but she never got treated until the incidence of MI in 2017.  Denies any known family history of MI at age less than 55.    Obesity  Patient has a BMI of 41.7.  Is trying to eat healthy diet but does not count calories and no exercise.  Never has seen a nutritionist.  She does have a stop bang score which is moderate risk for sleep apnea.  Never had a sleep study done in the past.    History of MI at age 36 years.  She had 1 stent placed.  Currently on aspirin and statin therapy.  Denies any chest pain, shortness of breath, dizziness, vision changes, palpitations.  No echocardiogram in file.  Per patient had an echo couple of years back at [facility] unclear results.    Reviewed labs with patient.  Patient has iron deficiency anemia with hemoglobin of 10.9.  Her labs also shows mild hypokalemia which appears to be chronic unclear if it is because of the medications versus also has a competent of adrenals.          ROS          General: No fevers, chills, night sweats, weight loss or gain  HEENT: No hearing changes, vision changes, eye pain, ear pain, nasal discharge, sore throat  Neck: No swelling in neck  Pulmonary: No shortness of breath, cough, sputum, or  "hemoptysis  Cardiovascular: No chest pain, palpitations, or LE swelling  GI: No nausea, vomiting, diarrhea, constipation, abdominal pain, hematochezia or melena  : No dysuria or frequency  Neuro: No focal weakness, no general weakness, no headaches, no lightheadedness, no dizziness  Psych: No anxiety or depression        Objective     BP (!) 160/98 (BP Location: Left arm, Patient Position: Sitting, BP Cuff Size: Large adult)   Pulse 83   Temp 36.9 °C (98.5 °F) (Temporal)   Ht 1.651 m (5' 5\")   Wt 114 kg (251 lb)   SpO2 96%   BMI 41.77 kg/m²      Physical Exam      General: Well developed, well nourished, obese female, in no distress.  HEENT: NC/AT, PERRL, EOMI, no scleral icterus or conjunctival pallor, fair dentition/denture in, no nasal discharge or oral erythema or exudates.   Neck: Supple, No cervical or supraclavicular LAD  CV:RRR, no murmurs gallops or rubs  Pulm: LCAB, no crackles, rales, rhonchi, or wheezing  GI: Normal bowel sounds, abdomen soft, nontender, nondistended to deep or light palpation in all 4 quadrants, no HSM.  MSK: normal ROM.Radial and dorsalis pedis pulses 2+ and equal bilaterally, respectively.No lower extremity edema  Neuro: Patient is alert and oriented x3, no focal deficits,Strength 5 out of 5 in upper and lower extremities  Psych: Appropriate mood and affect       Assessment & Plan        1. Iron deficiency anemia, unspecified iron deficiency anemia type    Labs show hemoglobin of 10.9 with iron studies showing mild iron deficiency  Plan  We will start on oral ferrous sulfate supplementation.  Patient is instructed to take 1 pill every other day.  Keep hydrated.  No history of constipation.  Can consider stool softeners if any constipation.    2. Resistant hypertension  I am concerned that patient might have resistant hypertension given she is already on 3 antihypertensives with uncontrolled blood pressure.  Most likely differentials include renal artery stenosis versus normal " adrenal hormone secretion/abnormal renin aldosterone system.  Unfortunately patient was already on ARB's and other blood pressure medications which might not give accurate levels of aldosterone and renin.  Plan  will start patient on Aldactone 25 mg daily.  Will continue the home antihypertensives  We will get aldosterone levels and renin levels to see the ratio  Patient reports she already has an order for renal ultrasound.  Can consider renal arterial ultrasound eventually.  Recommended low-salt diet, regular exercise and weight loss to improve blood pressure.  Continue to monitor blood pressure at home and maintain a log.  Patient is advised to seek immediate medical attention if blood pressure more than 200 or if she has symptoms of chest pain, shortness of breath, headache, vision changes or dizziness.  Patient also has hypokalemia  I am giving potassium supplements  We will check electrolyte levels within a month.    3.  Coronary artery disease  History of MI status post stent    Patient is advised to continue aspirin and statin therapy  We will get an echocardiogram   We will also send referral to sleep study to rule out sleep apnea    4.  Obesity with BMI of 41.7  Counseling and education provided for weight loss  Recommended regular exercise at least 45 minutes a day, at least for 5 days a week  Low calorie diet is recommended.  Referral to nutritionist is placed

## 2022-08-03 NOTE — PATIENT INSTRUCTIONS
Please get labs done before next visit   New medication added- aldactone   dose of carvedilol increased to 12.5 mg twice daily   Check BP daily and log it   Weight loss with healthy diet and exercise   Nutrition consult   Sleep study   Echocardiogram ordered

## 2022-08-11 ENCOUNTER — HOSPITAL ENCOUNTER (OUTPATIENT)
Dept: CARDIOLOGY | Facility: MEDICAL CENTER | Age: 42
End: 2022-08-11
Attending: STUDENT IN AN ORGANIZED HEALTH CARE EDUCATION/TRAINING PROGRAM
Payer: COMMERCIAL

## 2022-08-11 DIAGNOSIS — I1A.0 RESISTANT HYPERTENSION: ICD-10-CM

## 2022-08-11 DIAGNOSIS — I25.10 CORONARY ARTERY DISEASE INVOLVING NATIVE CORONARY ARTERY OF NATIVE HEART WITHOUT ANGINA PECTORIS: ICD-10-CM

## 2022-08-11 DIAGNOSIS — E66.01 CLASS 3 SEVERE OBESITY DUE TO EXCESS CALORIES WITH SERIOUS COMORBIDITY AND BODY MASS INDEX (BMI) OF 40.0 TO 44.9 IN ADULT (HCC): ICD-10-CM

## 2022-08-11 PROCEDURE — 93306 TTE W/DOPPLER COMPLETE: CPT

## 2022-08-12 LAB
LV EJECT FRACT  99904: 60
LV EJECT FRACT MOD 2C 99903: 52.46
LV EJECT FRACT MOD 4C 99902: 53.99
LV EJECT FRACT MOD BP 99901: 53.22

## 2022-08-12 PROCEDURE — 93306 TTE W/DOPPLER COMPLETE: CPT | Mod: 26 | Performed by: INTERNAL MEDICINE

## 2022-09-01 ENCOUNTER — HOSPITAL ENCOUNTER (OUTPATIENT)
Dept: RADIOLOGY | Facility: MEDICAL CENTER | Age: 42
End: 2022-09-01
Payer: COMMERCIAL

## 2022-09-01 DIAGNOSIS — I10 PRIMARY HYPERTENSION: ICD-10-CM

## 2022-09-01 PROCEDURE — 93975 VASCULAR STUDY: CPT | Mod: 26 | Performed by: INTERNAL MEDICINE

## 2022-09-01 PROCEDURE — 93975 VASCULAR STUDY: CPT

## 2022-09-18 ENCOUNTER — TELEPHONE (OUTPATIENT)
Dept: INTERNAL MEDICINE | Facility: OTHER | Age: 42
End: 2022-09-18
Payer: COMMERCIAL

## 2022-09-18 NOTE — TELEPHONE ENCOUNTER
I called the patient multiple times, unable to get through to them, left voicemail explaining results of the ultrasound, recommended the patient to keep a blood pressure log at home, and bring it to her next clinic appointment with her PCP, Dr. Metz.

## 2022-09-22 ENCOUNTER — TELEPHONE (OUTPATIENT)
Dept: INTERNAL MEDICINE | Facility: OTHER | Age: 42
End: 2022-09-22
Payer: COMMERCIAL

## 2022-09-22 NOTE — TELEPHONE ENCOUNTER
Called the patient to see how she is doing and to discuss her home blood pressure readings. I left a message with the patient that she should give our office a call when afforded the opportunity. Should the patient call back, please ask her to keep her upcoming appointment with our office and to check her blood pressures at home in the interim. Should her blood pressures continue to be >130/80 she should let us know. Thanks

## 2022-10-06 ENCOUNTER — APPOINTMENT (OUTPATIENT)
Dept: INTERNAL MEDICINE | Facility: OTHER | Age: 42
End: 2022-10-06
Payer: COMMERCIAL

## 2022-11-07 ENCOUNTER — OFFICE VISIT (OUTPATIENT)
Dept: INTERNAL MEDICINE | Facility: OTHER | Age: 42
End: 2022-11-07
Payer: COMMERCIAL

## 2022-11-07 VITALS
WEIGHT: 252.8 LBS | TEMPERATURE: 98.2 F | DIASTOLIC BLOOD PRESSURE: 115 MMHG | OXYGEN SATURATION: 94 % | BODY MASS INDEX: 43.16 KG/M2 | SYSTOLIC BLOOD PRESSURE: 196 MMHG | HEART RATE: 75 BPM | HEIGHT: 64 IN

## 2022-11-07 DIAGNOSIS — I16.0 HYPERTENSIVE URGENCY: ICD-10-CM

## 2022-11-07 DIAGNOSIS — I70.1 RENAL ARTERY STENOSIS IN 1 OF 2 VESSELS (HCC): ICD-10-CM

## 2022-11-07 DIAGNOSIS — I21.3 ACUTE ST ELEVATION MYOCARDIAL INFARCTION (STEMI), UNSPECIFIED ARTERY (HCC): ICD-10-CM

## 2022-11-07 PROCEDURE — 99214 OFFICE O/P EST MOD 30 MIN: CPT | Mod: GC | Performed by: HOSPITALIST

## 2022-11-07 RX ORDER — CHLORTHALIDONE 50 MG/1
50 TABLET ORAL DAILY
Qty: 90 TABLET | Refills: 1 | Status: SHIPPED | OUTPATIENT
Start: 2022-11-07

## 2022-11-07 RX ORDER — AMLODIPINE BESYLATE 5 MG/1
5 TABLET ORAL DAILY
Qty: 30 TABLET | Refills: 3 | Status: SHIPPED | OUTPATIENT
Start: 2022-11-07

## 2022-11-07 ASSESSMENT — ENCOUNTER SYMPTOMS
HEADACHES: 0
BLURRED VISION: 0
HEMOPTYSIS: 0
PALPITATIONS: 0
DIZZINESS: 0
SHORTNESS OF BREATH: 0
DOUBLE VISION: 0
SPUTUM PRODUCTION: 0
PHOTOPHOBIA: 0
ABDOMINAL PAIN: 0
COUGH: 0
EYE PAIN: 0

## 2022-11-07 ASSESSMENT — PATIENT HEALTH QUESTIONNAIRE - PHQ9: CLINICAL INTERPRETATION OF PHQ2 SCORE: 0

## 2022-11-07 ASSESSMENT — FIBROSIS 4 INDEX: FIB4 SCORE: 0.69

## 2022-11-07 NOTE — PROGRESS NOTES
"Subjective     Esa Da Silva is a 42 y.o. female who presents with Hypertension and Anemia (Patient here to follow up htn and Anemia)            HPI  1. Hypertensive urgency  In office blood pressure elevated originally and 204/111, heart rate 70 bpm.  Repeat blood pressure 196/115, heart rate of 75.  Patient denies any chest pain, lightheadedness, dizziness, vision changes, headache, difficulty breathing.  Patient reports that systolic blood pressures are typically in the 160s.  Patient reports that she was recently denied teeth cleaning by her dentist secondary to elevated blood pressures.  Patient asked if our office would complete paperwork that will allow her to get a teeth cleaning with her elevated blood pressures.  Patient advised of the dangers of elevated blood pressures while getting a teeth cleaning, and how it be best to proceed with a teeth cleaning after her blood pressures were controlled.  Paperwork was not completed but completion can be considered at a future visit if blood pressures are controlled.  Patient reports difficulty controlling her food choices as she \"works the night shift\" and often times eats items out of the vending machine as well as frozen dinners.  Patient reports that she feels she would benefit from a nutritionist counseling her.  Patient reports compliance with the following medications: Atorvastatin 80 mg daily critical 75 mg daily, ferrous sulfate 325 mg daily, carvedilol 12.5 mg daily, chlorthalidone 25 mg daily, potassium chloride 10 mg daily, and spironolactone 25 mg daily, and losartan 100 mg daily.    2. Renal artery stenosis in 1 of 2 vessels (HCC)  Renal ultrasound was concerning for renal artery stenosis of the middle right renal artery. Renal US revealed elevated mid renal artery velocity at 212cm/sec. Per literature, Velocities higher than 180 cm/s suggest the presence of a stenosis of more than 60%.  Patient reports that she is unaware of being told that " "she had renal artery stenosis at a previous office visit.    3. Acute ST elevation myocardial infarction (STEMI), unspecified artery (HCC)  Patient reports that she had a heart attack in 2017, which required a stent (she is not sure which vessel).  Patient reports that she has been following up with cardiology at [facility] (Dr. Catalina Plunkett).  Patient reports that she recently had an echo done and was told that the results were satisfactory and no additional follow-up would be needed.  Patient reports that it was noted her blood pressure was elevated at her cardiology visit but denies any changes to her current medication regimen.  Patient reports that she was told to follow-up with cardiology \"if anything comes up\".    Review of Systems   Constitutional:  Negative for malaise/fatigue.   HENT:  Negative for nosebleeds.    Eyes:  Negative for blurred vision, double vision, photophobia and pain.   Respiratory:  Negative for cough, hemoptysis, sputum production and shortness of breath.    Cardiovascular:  Negative for chest pain, palpitations and leg swelling.   Gastrointestinal:  Negative for abdominal pain.   Neurological:  Negative for dizziness and headaches.            Objective     BP (!) 196/115 (BP Location: Right arm, Patient Position: Sitting, BP Cuff Size: Adult long) Comment: repeated bp 5 mins  Pulse 75   Temp 36.8 °C (98.2 °F) (Temporal)   Ht 1.626 m (5' 4\")   Wt 115 kg (252 lb 12.8 oz)   SpO2 94%   BMI 43.39 kg/m²      Physical Exam  Constitutional:       General: She is not in acute distress.     Appearance: She is not ill-appearing or toxic-appearing.   HENT:      Head: Normocephalic and atraumatic.      Nose: No rhinorrhea.      Mouth/Throat:      Pharynx: No oropharyngeal exudate or posterior oropharyngeal erythema.   Eyes:      General: No scleral icterus.        Right eye: No discharge.         Left eye: No discharge.      Extraocular Movements: Extraocular movements intact. "   Cardiovascular:      Rate and Rhythm: Normal rate and regular rhythm.      Pulses: Normal pulses.      Heart sounds: Normal heart sounds.   Pulmonary:      Effort: Pulmonary effort is normal. No respiratory distress.      Breath sounds: Normal breath sounds. No wheezing.   Abdominal:      General: Bowel sounds are normal. There is no distension.      Palpations: Abdomen is soft.      Tenderness: There is no abdominal tenderness. There is no guarding.   Musculoskeletal:         General: Swelling present. No tenderness, deformity or signs of injury.      Cervical back: Normal range of motion and neck supple. No rigidity or tenderness.      Right lower leg: No edema.      Left lower leg: No edema.   Skin:     General: Skin is warm and dry.      Coloration: Skin is not jaundiced or pale.   Neurological:      General: No focal deficit present.      Mental Status: She is alert.      Motor: No weakness.      Coordination: Coordination normal.      Gait: Gait normal.   Psychiatric:         Mood and Affect: Mood normal.         Behavior: Behavior normal.             Assessment & Plan        1. Hypertensive urgency  Blood pressures remain uncontrolled with an office blood pressure at 196/115 on repeated tries.  Patient denies any chest pain, lightheadedness, dizziness, vision changes, headache, difficulty breathing.  Physical exam was negative for any strokelike symptoms.  Patient continues to be primarily asymptomatic without acute endorgan damage despite elevated blood pressures. I have counseled the patient extensively on the issue of this uncontrolled hypertension, including severe complications such as stroke, repeat myocardial infarction, blindness, and kidney failure.  Patient does seem motivated to decreasing her blood pressure. Patient's chlorthalidone was increased to 50mg and amlodipine was added during this visit. Patient reported not taking her furosemide. Patient form for dental cleaning was not completed  until patient can get her blood pressure under control.  - Comp Metabolic Panel; Future  - Referral to Nutrition Services  - TSH+FREE T4  - chlorthalidone (HYGROTON) 50 MG Tab; Take 1 Tablet by mouth every day.  Dispense: 90 Tablet; Refill: 1  - REFERRAL TO CARDIOLOGY  - amLODIPine (NORVASC) 5 MG Tab; Take 1 Tablet by mouth every day.  Dispense: 30 Tablet; Refill: 3  Home blood pressure monitoring:  - check your blood pressure every day and put it in a log  - pick a different time each day so we can see how it varies throughout the day  - when you check your blood pressure: make sure you sit quietly for 5-10 minutes beforehand, keep both feet flat on the ground, and make sure you use an arm cuff at heart height    Lifestyle factors to help manage blood pressure:  1) reduce stress with daily activity, consider yoga, breathing exercises (like 4-7-8 breathing technique)   2) reduce sodium to <2000 mg/day  3) DASH diet     4) 200-300 min/week cardio, which you can build up to.         2. Renal artery stenosis in 1 of 2 vessels (HCC)  Renal US revealed elevated mid renal artery velocity at 212cm/sec. Per literature, Velocities higher than 180 cm/s suggest the presence of a stenosis of more than 60%.  Per uptodate, Medical therapy for control of hypertension in all patients with unilateral renal artery stenosis. Ideally, antihypertensive therapy should include an agent that blocks the renin-angiotensin-aldosterone system, such as an angiotensin-converting enzyme (ACE) inhibitors or angiotensin receptor blockers (ARBs). The patient is currently on losartan 100mg QD.  Per the literature, If goal blood pressure is not reached with angiotensin inhibition alone, other antihypertensive drugs, such as a thiazide diuretic (preferably chlorthalidone or indapamide), a long-acting calcium channel blocker, a mineralocorticoid receptor antagonist, or a beta blocker, should be added as necessary.  - Comp Metabolic Panel; Future  -  chlorthalidone (HYGROTON) 50 MG Tab; Take 1 Tablet by mouth every day.  Dispense: 90 Tablet; Refill: 1  - REFERRAL TO CARDIOLOGY  - amLODIPine (NORVASC) 5 MG Tab; Take 1 Tablet by mouth every day.  Dispense: 30 Tablet; Refill: 3    3. Acute ST elevation myocardial infarction (STEMI), unspecified artery (HCC)  Patient denies any methamphetamine use currently or in the past that could have caused her to have a myocardial infarction at an early age.  Patient's high blood pressure continues to be a risk factor for continued heart injury. LABS 7/7/22: CRT 1.08, POTASSIUM 3.4; LIPID: TCHOL 152, TG 90, HDL 34, LDL 101H; HGBA1C 5.8%  ASCVD risk was calculated at 30%. Patient is to complete a release form so her cardiology records can be obtained from [facility].  Patient denies any chest pain at today's visit and has been advised of the importance of follow up with cardiology regularly.  -Continue atorvastatin, clopidogrel, and carvedilol as directed by cardiology.  - REFERRAL TO CARDIOLOGY    Follow-up: In 1 week to review lab work and to determine if any of the above medications needs to be adjusted.

## 2022-11-07 NOTE — PATIENT INSTRUCTIONS
That he fell and sort is when your care today, please complete lab work and follow-up in our office in 1 week.

## 2022-11-08 ENCOUNTER — TELEPHONE (OUTPATIENT)
Dept: INTERNAL MEDICINE | Facility: OTHER | Age: 42
End: 2022-11-08
Payer: COMMERCIAL

## 2023-01-09 ENCOUNTER — TELEPHONE (OUTPATIENT)
Dept: HEALTH INFORMATION MANAGEMENT | Facility: OTHER | Age: 43
End: 2023-01-09
Payer: COMMERCIAL

## 2025-06-04 NOTE — Clinical Note
REFERRAL APPROVAL NOTICE         Sent on June 4, 2025                   Esa Da Silva  [address]  [place] [address]                   Dear Ms. Da Silva,    After a careful review of the medical information and benefit coverage, [facility] has processed your referral. See below for additional details.    If applicable, you must be actively enrolled with your insurance for coverage of the authorized service. If you have any questions regarding your coverage, please contact your insurance directly.    REFERRAL INFORMATION   Referral #:  [id number]  Referred-To Department    Referred-By Provider:  Hematology Oncology    MANOJ TAYLOR N.P.   [facility]      [address] [place] [id number] [phone] [address]  [place] [id number] [phone]    Referral Start Date:  06/03/2025  Referral End Date:   06/03/2026           SCHEDULING  If you do not already have an appointment, please call [phone] to make an appointment.   MORE INFORMATION  As a reminder, [facility] ownership has changed, meaning this location is now owned and operated by [facility]. As such, we want to clarify that our patients should expect to receive two separate bills for the services received at [facility] - one representing the [facility] facility fees as the owner of the establishment, and the other to represent the physician's services and subsequent fees. You can speak with your insurance carrier for a pricing estimate by calling the customer service number on the back of your card and ask about charges for a hospital outpatient visit.  If you do not already have a [facility] account, sign up at: [email or web address]  You can access your medical information, make appointments, see lab results, billing information, and more.  If you have questions regarding this referral, please contact  the [facility] Referrals department at:             [phone]. Monday -  Friday 7:30AM - 5:00PM.      Sincerely,  [facility]

## 2025-06-23 ENCOUNTER — NON-PROVIDER VISIT (OUTPATIENT)
Dept: URGENT CARE | Facility: CLINIC | Age: 45
End: 2025-06-23

## 2025-06-23 DIAGNOSIS — Z11.1 PPD SCREENING TEST: Primary | ICD-10-CM

## 2025-06-23 PROCEDURE — 86580 TB INTRADERMAL TEST: CPT

## 2025-06-23 NOTE — PROGRESS NOTES
Esa Da Silva is a 44 y.o. female here for a non-provider visit for PPD placement -- Step 1 of 1    Reason for PPD:  work requirement    1. TB evaluation questionnaire completed by patient? Yes      -  If any answers marked yes did you contact a provider prior to placing? Not Indicated  2.  Patient notified to return to clinic for reading on: 6/25 or 6/26  3.  PPD Placement documentation completed on TB evaluation questionnaire? Yes  4.  Location of TB evaluation questionnaire filed: [facility]

## 2025-06-26 ENCOUNTER — NON-PROVIDER VISIT (OUTPATIENT)
Dept: URGENT CARE | Facility: CLINIC | Age: 45
End: 2025-06-26

## 2025-06-26 LAB — TB WHEAL 3D P 5 TU DIAM: 0 MM

## 2025-06-26 NOTE — PROGRESS NOTES
Esa Da Silva is a 44 y.o. female here for a non-provider visit for PPD reading -- Step 1 of 1.      1.  Resulted in Epic under enter/edit results? Yes   2.  TB evaluation questionnaire scanned into chart and original given to patient?Yes      3. Was induration greater than 0 mm? No.        Routed to PCP? Yes

## 2025-07-25 ENCOUNTER — HOSPITAL ENCOUNTER (OUTPATIENT)
Dept: RADIOLOGY | Facility: MEDICAL CENTER | Age: 45
End: 2025-07-25
Payer: COMMERCIAL

## 2025-07-25 ENCOUNTER — TELEPHONE (OUTPATIENT)
Facility: MEDICAL CENTER | Age: 45
End: 2025-07-25
Payer: COMMERCIAL